# Patient Record
Sex: FEMALE | URBAN - METROPOLITAN AREA
[De-identification: names, ages, dates, MRNs, and addresses within clinical notes are randomized per-mention and may not be internally consistent; named-entity substitution may affect disease eponyms.]

---

## 2017-05-17 ENCOUNTER — APPOINTMENT (RX ONLY)
Dept: URBAN - METROPOLITAN AREA CLINIC 45 | Facility: CLINIC | Age: 22
Setting detail: DERMATOLOGY
End: 2017-05-17

## 2017-05-17 DIAGNOSIS — Z79.899 OTHER LONG TERM (CURRENT) DRUG THERAPY: ICD-10-CM

## 2017-05-17 DIAGNOSIS — L85.3 XEROSIS CUTIS: ICD-10-CM

## 2017-05-17 DIAGNOSIS — L70.0 ACNE VULGARIS: ICD-10-CM

## 2017-05-17 PROCEDURE — ? PRESCRIPTION

## 2017-05-17 PROCEDURE — 99214 OFFICE O/P EST MOD 30 MIN: CPT

## 2017-05-17 PROCEDURE — ? TREATMENT REGIMEN

## 2017-05-17 PROCEDURE — ? HIGH RISK MEDICATION MONITORING

## 2017-05-17 PROCEDURE — ? ORDER TESTS

## 2017-05-17 PROCEDURE — ? COUNSELING

## 2017-05-17 PROCEDURE — ? DIAGNOSIS COMMENT

## 2017-05-17 RX ORDER — ISOTRETINOIN 40 MG/1
CAPSULE ORAL
Qty: 60 | Refills: 0 | Status: ERX | COMMUNITY
Start: 2017-05-17

## 2017-05-17 RX ADMIN — ISOTRETINOIN: 40 CAPSULE ORAL at 12:53

## 2017-05-17 ASSESSMENT — LOCATION SIMPLE DESCRIPTION DERM
LOCATION SIMPLE: LEFT CHEEK
LOCATION SIMPLE: RIGHT CHEEK

## 2017-05-17 ASSESSMENT — LOCATION DETAILED DESCRIPTION DERM
LOCATION DETAILED: LEFT INFERIOR CENTRAL MALAR CHEEK
LOCATION DETAILED: RIGHT INFERIOR CENTRAL MALAR CHEEK
LOCATION DETAILED: LEFT MEDIAL MALAR CHEEK

## 2017-05-17 ASSESSMENT — LOCATION ZONE DERM: LOCATION ZONE: FACE

## 2017-05-17 NOTE — PROCEDURE: TREATMENT REGIMEN
Detail Level: Zone
Plan: Location: face\\nDuration: finished 2nd month, going on 3rd \\nMethod of Contraception: abs\\nPrescribed: Absorica 80mg daily\\nPharmacy: [facility]\\niPledge: [phone]\\n\\n\\n\\nPt is here for a one month accutane follow up. She does not complain of side effects other than dryness.\\nShe continues to improve, her deep cystic acne is not as painful as before. Breakouts are less frequent \\nDiscussed with her to continue taking 80mg daily. Continue usage of cerave reji and ointment to help with dryness\\nIPLEDGE program and consent form discussed with patient. The side effects and warnings for the use of Accutane were discussed with the patient. She understands she can not donate blood for 1 month post tx and can not have laser tx, cosmetic surgery, waxing or peels for 6 months post treatment.\\n\\n\\nDiscussed musculoskeletal SE’s in detail. Discussed HA’s, dry skin, and depression in detail. Patient denies depression. Denies personal or family H/O Crohns, IBS, IBD, or bloody stools.\\n\\n\\nUnderstand the need to fill Rx in 6 days and have monthly labs and OV.

## 2017-05-17 NOTE — HPI: PIMPLES (ACNE)
How Severe Is Your Acne?: severe
Is This A New Presentation, Or A Follow-Up?: Follow Up Acne
Additional Comments (Use Complete Sentences): Pt finished 2nd month, going on 3rd

## 2017-06-22 ENCOUNTER — APPOINTMENT (RX ONLY)
Dept: URBAN - METROPOLITAN AREA CLINIC 45 | Facility: CLINIC | Age: 22
Setting detail: DERMATOLOGY
End: 2017-06-22

## 2017-06-22 VITALS — WEIGHT: 135 LBS | HEIGHT: 62 IN

## 2017-06-22 DIAGNOSIS — Z79.899 OTHER LONG TERM (CURRENT) DRUG THERAPY: ICD-10-CM

## 2017-06-22 DIAGNOSIS — L70.0 ACNE VULGARIS: ICD-10-CM

## 2017-06-22 DIAGNOSIS — L85.3 XEROSIS CUTIS: ICD-10-CM

## 2017-06-22 PROCEDURE — ? COUNSELING

## 2017-06-22 PROCEDURE — ? ORDER TESTS

## 2017-06-22 PROCEDURE — ? TREATMENT REGIMEN

## 2017-06-22 PROCEDURE — ? PRESCRIPTION

## 2017-06-22 PROCEDURE — 99214 OFFICE O/P EST MOD 30 MIN: CPT

## 2017-06-22 PROCEDURE — ? DIAGNOSIS COMMENT

## 2017-06-22 PROCEDURE — ? HIGH RISK MEDICATION MONITORING

## 2017-06-22 RX ORDER — ISOTRETINOIN 40 MG/1
CAPSULE ORAL
Qty: 60 | Refills: 0 | Status: ERX

## 2017-06-22 ASSESSMENT — LOCATION SIMPLE DESCRIPTION DERM
LOCATION SIMPLE: LEFT CHEEK
LOCATION SIMPLE: RIGHT CHEEK

## 2017-06-22 ASSESSMENT — LOCATION DETAILED DESCRIPTION DERM
LOCATION DETAILED: RIGHT INFERIOR CENTRAL MALAR CHEEK
LOCATION DETAILED: LEFT INFERIOR CENTRAL MALAR CHEEK
LOCATION DETAILED: LEFT MEDIAL MALAR CHEEK

## 2017-06-22 ASSESSMENT — LOCATION ZONE DERM: LOCATION ZONE: FACE

## 2017-06-22 NOTE — PROCEDURE: TREATMENT REGIMEN
Detail Level: Zone
Plan: Location: face\\nDuration: finished 3rd month, going on 4th\\nMethod of Contraception: abs\\nPrescribed: Absorica 80mg daily\\nPharmacy: [facility]\\niPledge: [phone]\\n\\n\\n\\nPt is here for a 3rd month accutane follow up and is responding well to the medication. \\nToday, she presents several open and closed comedones, and larger ones will be extracted today with an 11 blade and cotton swabs.\\nShe denies experiencing joint pain, headaches, mood changes, and depression.\\nShe continues to improve, her deep cystic acne is not as painful as before. Breakouts are less frequent.\\nDiscussed with her to continue taking 80mg daily. Continue usage of cerave MC and ointment to help with dryness.\\n\\nIPLEDGE program and consent form discussed with patient. The side effects and warnings for the use of Accutane were discussed with the patient. She understands she can not donate blood for 1 month post tx and can not have laser tx, cosmetic surgery, waxing or peels for 6 months post treatment.\\n\\n\\nDiscussed musculoskeletal SE’s in detail. Discussed HA’s, dry skin, and depression in detail. Patient denies depression. Denies personal or family H/O Crohns, IBS, IBD, or bloody stools.\\n\\n\\nUnderstand the need to fill Rx in 6 days and have monthly labs and OV.

## 2017-07-26 ENCOUNTER — APPOINTMENT (RX ONLY)
Dept: URBAN - METROPOLITAN AREA CLINIC 45 | Facility: CLINIC | Age: 22
Setting detail: DERMATOLOGY
End: 2017-07-26

## 2017-07-26 ENCOUNTER — RX ONLY (OUTPATIENT)
Age: 22
Setting detail: RX ONLY
End: 2017-07-26

## 2017-07-26 DIAGNOSIS — Z79.899 OTHER LONG TERM (CURRENT) DRUG THERAPY: ICD-10-CM

## 2017-07-26 DIAGNOSIS — L70.0 ACNE VULGARIS: ICD-10-CM

## 2017-07-26 DIAGNOSIS — L85.3 XEROSIS CUTIS: ICD-10-CM

## 2017-07-26 PROCEDURE — 99214 OFFICE O/P EST MOD 30 MIN: CPT

## 2017-07-26 PROCEDURE — ? DIAGNOSIS COMMENT

## 2017-07-26 PROCEDURE — ? PRESCRIPTION

## 2017-07-26 PROCEDURE — ? TREATMENT REGIMEN

## 2017-07-26 PROCEDURE — ? HIGH RISK MEDICATION MONITORING

## 2017-07-26 PROCEDURE — ? COUNSELING

## 2017-07-26 RX ORDER — ISOTRETINOIN 40 MG/1
CAPSULE ORAL
Qty: 60 | Refills: 0 | Status: ERX

## 2017-07-26 ASSESSMENT — LOCATION DETAILED DESCRIPTION DERM
LOCATION DETAILED: LEFT MEDIAL MALAR CHEEK
LOCATION DETAILED: RIGHT INFERIOR CENTRAL MALAR CHEEK
LOCATION DETAILED: LEFT INFERIOR CENTRAL MALAR CHEEK

## 2017-07-26 ASSESSMENT — LOCATION SIMPLE DESCRIPTION DERM
LOCATION SIMPLE: RIGHT CHEEK
LOCATION SIMPLE: LEFT CHEEK

## 2017-07-26 ASSESSMENT — LOCATION ZONE DERM: LOCATION ZONE: FACE

## 2017-07-26 NOTE — PROCEDURE: TREATMENT REGIMEN
Detail Level: Zone
Plan: Location: face\\nDuration: finished 4th going on 5th\\nMethod of Contraception: abs\\nPrescribed: Absorica 80mg daily\\nPharmacy: [facility]\\niPledge: [phone]\\n\\n\\n\\nPt is here for a 4 month accutane follow up and is responding well to the medication. \\nToday, she presents only one open and closed comedones, which will be extracted today with an 11 blade and cotton swabs.\\nShe denies experiencing joint pain, headaches, mood changes, and depression.\\nShe continues to improve, her deep cystic acne is not as painful as before. Breakouts are less frequent.\\nDiscussed with her to continue taking 80mg daily. Continue usage of cerave MC and ointment to help with dryness.\\n\\nIPLEDGE program and consent form discussed with patient. The side effects and warnings for the use of Accutane were discussed with the patient. She understands she can not donate blood for 1 month post tx and can not have laser tx, cosmetic surgery, waxing or peels for 6 months post treatment.\\n\\n\\nDiscussed musculoskeletal SE’s in detail. Discussed HA’s, dry skin, and depression in detail. Patient denies depression. Denies personal or family H/O Crohns, IBS, IBD, or bloody stools.\\n\\n\\nUnderstand the need to fill Rx in 6 days and have monthly labs and OV.

## 2017-08-24 ENCOUNTER — APPOINTMENT (RX ONLY)
Dept: URBAN - METROPOLITAN AREA CLINIC 45 | Facility: CLINIC | Age: 22
Setting detail: DERMATOLOGY
End: 2017-08-24

## 2017-08-24 DIAGNOSIS — L70.0 ACNE VULGARIS: ICD-10-CM

## 2017-08-24 DIAGNOSIS — L85.3 XEROSIS CUTIS: ICD-10-CM

## 2017-08-24 DIAGNOSIS — Z79.899 OTHER LONG TERM (CURRENT) DRUG THERAPY: ICD-10-CM

## 2017-08-24 PROCEDURE — ? HIGH RISK MEDICATION MONITORING

## 2017-08-24 PROCEDURE — ? PRESCRIPTION

## 2017-08-24 PROCEDURE — ? TREATMENT REGIMEN

## 2017-08-24 PROCEDURE — 99214 OFFICE O/P EST MOD 30 MIN: CPT

## 2017-08-24 PROCEDURE — ? COUNSELING

## 2017-08-24 PROCEDURE — ? DIAGNOSIS COMMENT

## 2017-08-24 RX ORDER — ISOTRETINOIN 40 MG/1
CAPSULE ORAL
Qty: 60 | Refills: 0 | Status: ERX

## 2017-08-24 ASSESSMENT — LOCATION ZONE DERM: LOCATION ZONE: FACE

## 2017-08-24 ASSESSMENT — LOCATION DETAILED DESCRIPTION DERM
LOCATION DETAILED: LEFT MEDIAL MALAR CHEEK
LOCATION DETAILED: LEFT INFERIOR CENTRAL MALAR CHEEK
LOCATION DETAILED: RIGHT INFERIOR CENTRAL MALAR CHEEK

## 2017-08-24 ASSESSMENT — LOCATION SIMPLE DESCRIPTION DERM
LOCATION SIMPLE: LEFT CHEEK
LOCATION SIMPLE: RIGHT CHEEK

## 2017-08-24 NOTE — PROCEDURE: TREATMENT REGIMEN
Plan: Location: face\\nDuration: finished 5th going on 6th\\nMethod of Contraception: abs\\nPrescribed: Absorica 80mg daily\\nPharmacy: [facility]\\niPledge: [phone]\\n\\n\\n\\nPt is here for a 5th month accutane follow up and is responding well to the medication. \\nToday, she presents only one open and closed comedones, which will be extracted today with an 11 blade and cotton swabs.\\nShe denies experiencing joint pain, headaches, mood changes, and depression.\\nShe continues to improve, her deep cystic acne is not as painful as before. Breakouts are less frequent.\\nDiscussed with her to continue taking 80mg daily. Continue usage of cerave MC and ointment to help with dryness.\\n\\nIPLEDGE program and consent form discussed with patient. The side effects and warnings for the use of Accutane were discussed with the patient. She understands she can not donate blood for 1 month post tx and can not have laser tx, cosmetic surgery, waxing or peels for 6 months post treatment.\\n\\n\\nDiscussed musculoskeletal SE’s in detail. Discussed HA’s, dry skin, and depression in detail. Patient denies depression. Denies personal or family H/O Crohns, IBS, IBD, or bloody stools.\\n\\n\\nUnderstand the need to fill Rx in 6 days and have monthly labs and OV.
Detail Level: Zone

## 2017-09-28 ENCOUNTER — APPOINTMENT (RX ONLY)
Dept: URBAN - METROPOLITAN AREA CLINIC 45 | Facility: CLINIC | Age: 22
Setting detail: DERMATOLOGY
End: 2017-09-28

## 2017-09-28 VITALS — WEIGHT: 140 LBS | HEIGHT: 62 IN

## 2017-09-28 DIAGNOSIS — L85.3 XEROSIS CUTIS: ICD-10-CM

## 2017-09-28 DIAGNOSIS — Z79.899 OTHER LONG TERM (CURRENT) DRUG THERAPY: ICD-10-CM

## 2017-09-28 DIAGNOSIS — L70.0 ACNE VULGARIS: ICD-10-CM

## 2017-09-28 PROCEDURE — 99214 OFFICE O/P EST MOD 30 MIN: CPT

## 2017-09-28 PROCEDURE — ? TREATMENT REGIMEN

## 2017-09-28 PROCEDURE — ? PRESCRIPTION

## 2017-09-28 PROCEDURE — ? COUNSELING

## 2017-09-28 PROCEDURE — ? DIAGNOSIS COMMENT

## 2017-09-28 PROCEDURE — ? HIGH RISK MEDICATION MONITORING

## 2017-09-28 RX ORDER — ISOTRETINOIN 40 MG/1
CAPSULE ORAL
Qty: 60 | Refills: 0 | Status: ERX

## 2017-09-28 ASSESSMENT — LOCATION ZONE DERM: LOCATION ZONE: FACE

## 2017-09-28 ASSESSMENT — LOCATION SIMPLE DESCRIPTION DERM
LOCATION SIMPLE: RIGHT CHEEK
LOCATION SIMPLE: LEFT CHEEK

## 2017-09-28 NOTE — PROCEDURE: TREATMENT REGIMEN
Detail Level: Zone
Plan: Location: face\\nDuration: finished 6th going on 7th\\nMethod of Contraception: Abstinence\\nPrescribed: Absorica 80mg daily\\nPharmacy: [facility]\\niPledge: [phone]\\n\\nPt is here for a 6th month accutane follow up and is responding well to the medication. \\nToday, she presents with a lot of hyperpigmentation-- discussed with pt that she can have treatment with laser at [place] office after finishing Accutane treatment\\nPt states she had three small breakouts within the month.\\nShe denies experiencing joint pain, headaches, mood changes, and depression.\\nOverall, the patient is very satisfied with the results and breakouts are less frequent.\\nDiscussed with her to continue taking 80mg daily. \\nDiscussed with pt to take medication with fatty meal.\\nContinue usage of cerave MC and ointment to help with dryness.\\nUrine pregnancy: negative.\\nDiscussed with pt since she is still having flare ups, will continue another month of Accutane.\\nDiscussed with pt if flare ups stop next month, then we will try to take a one month break to see if acne returns.\\nDiscussed with pt during break, if she has a flare up or oiliness comes back, then we will continue her on Accutane.\\n\\nIPLEDGE program and consent form discussed with patient. The side effects and warnings for the use of Accutane were discussed with the patient. She understands she can not donate blood for 1 month post tx and can not have laser tx, cosmetic surgery, waxing or peels for 6 months post treatment.\\n\\nDiscussed musculoskeletal SE’s in detail. Discussed HA’s, dry skin, and depression in detail. Patient denies depression. Denies personal or family H/O Crohns, IBS, IBD, or bloody stools.\\n\\nUnderstand the need to fill Rx in 6 days and have monthly labs and OV.\\nF/u in 1 month

## 2017-10-19 ENCOUNTER — RX ONLY (OUTPATIENT)
Age: 22
Setting detail: RX ONLY
End: 2017-10-19

## 2017-10-19 RX ORDER — DESOXIMETASONE 2.5 MG/ML
SPRAY TOPICAL
Qty: 1 | Refills: 4 | Status: ERX | COMMUNITY
Start: 2017-10-19

## 2017-10-27 ENCOUNTER — APPOINTMENT (RX ONLY)
Dept: URBAN - METROPOLITAN AREA CLINIC 45 | Facility: CLINIC | Age: 22
Setting detail: DERMATOLOGY
End: 2017-10-27

## 2017-10-27 DIAGNOSIS — L57.0 ACTINIC KERATOSIS: ICD-10-CM

## 2017-10-27 PROCEDURE — ? PDT: BLUE

## 2017-10-27 PROCEDURE — ? TREATMENT REGIMEN

## 2017-10-27 PROCEDURE — ? PRESCRIPTION

## 2017-10-27 RX ORDER — SULFAMETHOXAZOLE AND TRIMETHOPRIM 800; 160 MG/1; MG/1
TABLET ORAL
Qty: 20 | Refills: 0 | Status: CANCELLED
Stop reason: CLARIF

## 2017-10-27 RX ORDER — TRAMADOL HYDROCHLORIDE 50 MG/1
TABLET, FILM COATED ORAL
Qty: 30 | Refills: 0 | Status: CANCELLED
Stop reason: CLARIF

## 2017-10-27 RX ORDER — PREDNISONE 20 MG/1
TABLET ORAL
Qty: 8 | Refills: 0 | Status: CANCELLED
Stop reason: CLARIF

## 2017-10-27 RX ORDER — VALACYCLOVIR HYDROCHLORIDE 1 G/1
TABLET, FILM COATED ORAL
Qty: 14 | Refills: 0 | Status: CANCELLED
Stop reason: CLARIF

## 2017-10-27 ASSESSMENT — LOCATION SIMPLE DESCRIPTION DERM
LOCATION SIMPLE: LEFT FOREARM
LOCATION SIMPLE: RIGHT PRETIBIAL REGION
LOCATION SIMPLE: LEFT CHEEK
LOCATION SIMPLE: NOSE
LOCATION SIMPLE: LEFT ELBOW
LOCATION SIMPLE: RIGHT FOREHEAD
LOCATION SIMPLE: CHEST
LOCATION SIMPLE: INFERIOR FOREHEAD
LOCATION SIMPLE: ANTERIOR SCALP
LOCATION SIMPLE: RIGHT EAR
LOCATION SIMPLE: LEFT SCALP
LOCATION SIMPLE: SCALP
LOCATION SIMPLE: POSTERIOR SCALP
LOCATION SIMPLE: RIGHT CHEEK
LOCATION SIMPLE: LEFT KNEE
LOCATION SIMPLE: SUPERIOR FOREHEAD
LOCATION SIMPLE: RIGHT KNEE
LOCATION SIMPLE: UPPER LIP
LOCATION SIMPLE: LEFT EAR
LOCATION SIMPLE: LEFT PRETIBIAL REGION
LOCATION SIMPLE: CHIN
LOCATION SIMPLE: GLABELLA
LOCATION SIMPLE: RIGHT SCALP
LOCATION SIMPLE: LEFT FOREHEAD
LOCATION SIMPLE: LEFT HAND
LOCATION SIMPLE: RIGHT HAND
LOCATION SIMPLE: RIGHT SHOULDER
LOCATION SIMPLE: RIGHT ELBOW
LOCATION SIMPLE: RIGHT FOREARM

## 2017-10-27 ASSESSMENT — LOCATION DETAILED DESCRIPTION DERM
LOCATION DETAILED: LEFT MEDIAL SUPERIOR CHEST
LOCATION DETAILED: SUPERIOR MID FOREHEAD
LOCATION DETAILED: LEFT CENTRAL BUCCAL CHEEK
LOCATION DETAILED: RIGHT CENTRAL PARIETAL SCALP
LOCATION DETAILED: POSTERIOR MID-PARIETAL SCALP
LOCATION DETAILED: LEFT MENTAL CREASE
LOCATION DETAILED: RIGHT INFERIOR LATERAL FOREHEAD
LOCATION DETAILED: RIGHT CENTRAL MALAR CHEEK
LOCATION DETAILED: LEFT MEDIAL BUCCAL CHEEK
LOCATION DETAILED: RIGHT ANTERIOR SHOULDER
LOCATION DETAILED: RIGHT INFERIOR FOREHEAD
LOCATION DETAILED: LEFT SUPERIOR PARIETAL SCALP
LOCATION DETAILED: LEFT SUPERIOR MEDIAL BUCCAL CHEEK
LOCATION DETAILED: LEFT SUPERIOR LATERAL BUCCAL CHEEK
LOCATION DETAILED: LEFT INFERIOR FOREHEAD
LOCATION DETAILED: RIGHT INFERIOR CENTRAL MALAR CHEEK
LOCATION DETAILED: LEFT CENTRAL FRONTAL SCALP
LOCATION DETAILED: LEFT MEDIAL MALAR CHEEK
LOCATION DETAILED: LEFT RADIAL DORSAL HAND
LOCATION DETAILED: LEFT ELBOW
LOCATION DETAILED: RIGHT LATERAL FOREHEAD
LOCATION DETAILED: LEFT CHIN
LOCATION DETAILED: LEFT INFERIOR LATERAL MALAR CHEEK
LOCATION DETAILED: INFERIOR MID FOREHEAD
LOCATION DETAILED: LEFT PROXIMAL DORSAL FOREARM
LOCATION DETAILED: 4TH WEB SPACE RIGHT HAND
LOCATION DETAILED: RIGHT ANTERIOR EARLOBE
LOCATION DETAILED: RIGHT FOREHEAD
LOCATION DETAILED: LEFT LATERAL SUPERIOR CHEST
LOCATION DETAILED: LEFT DISTAL DORSAL FOREARM
LOCATION DETAILED: UPPER STERNUM
LOCATION DETAILED: RIGHT INFERIOR MEDIAL MALAR CHEEK
LOCATION DETAILED: NASAL ROOT
LOCATION DETAILED: LEFT FOREHEAD
LOCATION DETAILED: LEFT INFERIOR MEDIAL MALAR CHEEK
LOCATION DETAILED: LEFT PROXIMAL PRETIBIAL REGION
LOCATION DETAILED: LEFT SUPERIOR FOREHEAD
LOCATION DETAILED: RIGHT SUPERIOR FOREHEAD
LOCATION DETAILED: RIGHT PROXIMAL PRETIBIAL REGION
LOCATION DETAILED: RIGHT SUPERIOR HELIX
LOCATION DETAILED: GLABELLA
LOCATION DETAILED: RIGHT LATERAL SUPERIOR CHEST
LOCATION DETAILED: RIGHT MEDIAL MALAR CHEEK
LOCATION DETAILED: RIGHT MEDIAL SUPERIOR CHEST
LOCATION DETAILED: RIGHT ELBOW
LOCATION DETAILED: LEFT ULNAR DORSAL HAND
LOCATION DETAILED: RIGHT KNEE
LOCATION DETAILED: LEFT MEDIAL FOREHEAD
LOCATION DETAILED: MIDDLE STERNUM
LOCATION DETAILED: RIGHT ULNAR DORSAL HAND
LOCATION DETAILED: LEFT CENTRAL PARIETAL SCALP
LOCATION DETAILED: PHILTRUM
LOCATION DETAILED: LEFT INFERIOR CENTRAL MALAR CHEEK
LOCATION DETAILED: RIGHT PROXIMAL DORSAL FOREARM
LOCATION DETAILED: LEFT ANTERIOR EARLOBE
LOCATION DETAILED: RIGHT RADIAL DORSAL HAND
LOCATION DETAILED: LEFT INFERIOR LATERAL FOREHEAD
LOCATION DETAILED: LEFT LATERAL MALAR CHEEK
LOCATION DETAILED: RIGHT CENTRAL FRONTAL SCALP
LOCATION DETAILED: RIGHT CENTRAL BUCCAL CHEEK
LOCATION DETAILED: RIGHT DISTAL DORSAL FOREARM
LOCATION DETAILED: MID-OCCIPITAL SCALP
LOCATION DETAILED: MID-FRONTAL SCALP
LOCATION DETAILED: LEFT KNEE
LOCATION DETAILED: RIGHT ANTIHELIX
LOCATION DETAILED: RIGHT DISTAL PRETIBIAL REGION
LOCATION DETAILED: RIGHT SUPERIOR MEDIAL BUCCAL CHEEK
LOCATION DETAILED: LEFT DISTAL PRETIBIAL REGION
LOCATION DETAILED: LEFT SUPERIOR HELIX
LOCATION DETAILED: NASAL DORSUM

## 2017-10-27 ASSESSMENT — LOCATION ZONE DERM
LOCATION ZONE: ARM
LOCATION ZONE: FACE
LOCATION ZONE: TRUNK
LOCATION ZONE: LEG
LOCATION ZONE: SCALP
LOCATION ZONE: NOSE
LOCATION ZONE: HAND
LOCATION ZONE: EAR
LOCATION ZONE: LIP

## 2017-10-27 NOTE — PROCEDURE: PDT: BLUE
Incubation Time: 04:00:00
Treatment Number: 0
Detail Level: Zone
Illumination Time: 00:16:40
Anesthesia Type: 1% lidocaine with epinephrine
Post-Care Instructions: I reviewed with the patient in detail post-care instructions. Patient is to avoid sunlight for the next 2 days, and wear sun protection. Patients may expect sunburn like redness, discomfort and scabbing.
Was Levulan/Ameluz Applied On A Previous Day?: No
Pre-Procedure Text: The treatment areas were cleaned and prepped in the usual fashion.
Number Of Kerasticks/Tubes Billed For: 1
Which Photosensitizer Was Used: Levulan
Consent: Written consent obtained.  The risks were reviewed with the patient including but not limited to: pigmentary changes, pain, blistering, scabbing, redness, and the remote possibility of scarring.
Light Source: Reggie-U
Consent: Written consent obtained.  The risks were reviewed with the patient including but not limited to: pigmentary changes, pain, blistering, scabbing, redness, and the remote possibility of scarring.
Incubation Time: 02:00:00
Incubation Time: 03:00:00

## 2017-11-02 ENCOUNTER — APPOINTMENT (RX ONLY)
Dept: URBAN - METROPOLITAN AREA CLINIC 45 | Facility: CLINIC | Age: 22
Setting detail: DERMATOLOGY
End: 2017-11-02

## 2017-11-02 DIAGNOSIS — L70.0 ACNE VULGARIS: ICD-10-CM

## 2017-11-02 DIAGNOSIS — L85.3 XEROSIS CUTIS: ICD-10-CM

## 2017-11-02 DIAGNOSIS — Z79.899 OTHER LONG TERM (CURRENT) DRUG THERAPY: ICD-10-CM

## 2017-11-02 PROCEDURE — ? DIAGNOSIS COMMENT

## 2017-11-02 PROCEDURE — ? PRESCRIPTION

## 2017-11-02 PROCEDURE — ? COUNSELING

## 2017-11-02 PROCEDURE — ? HIGH RISK MEDICATION MONITORING

## 2017-11-02 PROCEDURE — ? TREATMENT REGIMEN

## 2017-11-02 RX ORDER — ISOTRETINOIN 40 MG/1
CAPSULE ORAL
Qty: 60 | Refills: 0 | Status: ERX

## 2017-11-02 ASSESSMENT — LOCATION ZONE DERM: LOCATION ZONE: FACE

## 2017-11-02 ASSESSMENT — LOCATION SIMPLE DESCRIPTION DERM
LOCATION SIMPLE: RIGHT CHEEK
LOCATION SIMPLE: LEFT CHEEK

## 2017-11-02 NOTE — PROCEDURE: TREATMENT REGIMEN
Plan: Location: face\\nDuration: finished 7th going on 8th\\nMethod of Contraception: Abstinence\\nPrescribed: Absorica 80mg daily\\nPharmacy: [facility]\\niPledge: [phone]\\n\\nPt is here for Accutane f/u.\\nPt is on currently on Absorica 80mg po qd x 30 days.\\nPt states her skin is getting smoother and has improved while on medication.\\nPt discussed that she has dryness, but no other side effects.\\nPt states that she still gets flare ups, but not as frequent or intense as last month.\\nToday pt still has post inflammatory hyperpigmentation on checks as well.\\nDiscussed with pt that she can go to [place] office for IPL treatment after she is done with her Accutane \\nDiscussed with pt to use MC/ healing ointment such as Cerave to help reestablish a healthy skin barrier.\\nWill continue pt on Absorica 80mg po qd x 30 days.\\nDiscussed with pt to take medication with fatty meal.\\nDiscussed with pt since she is still having flare ups, will continue another month of Accutane.\\nDiscussed with pt that this may be her last month.\\nDiscussed that we may take a break next month to see if acne returns.\\nDiscussed with pt during break, if she has a flare up or oiliness comes back, then we will continue her on Accutane.\\n\\nIPLEDGE program and consent form discussed with patient. The side effects and warnings for the use of Accutane were discussed with the patient. She understands she can not donate blood for 1 month post tx and can not have laser tx, cosmetic surgery, waxing or peels for 6 months post treatment.\\n\\nDiscussed musculoskeletal SE’s in detail. Discussed HA’s, dry skin, and depression in detail. Patient denies depression. Denies personal or family H/O Crohns, IBS, IBD, or bloody stools.\\n\\nUnderstand the need to fill Rx in 6 days and have monthly labs and OV.\\nF/u in 1 month
Detail Level: Zone

## 2017-12-06 ENCOUNTER — APPOINTMENT (RX ONLY)
Dept: URBAN - METROPOLITAN AREA CLINIC 45 | Facility: CLINIC | Age: 22
Setting detail: DERMATOLOGY
End: 2017-12-06

## 2017-12-06 DIAGNOSIS — L85.3 XEROSIS CUTIS: ICD-10-CM

## 2017-12-06 DIAGNOSIS — Z79.899 OTHER LONG TERM (CURRENT) DRUG THERAPY: ICD-10-CM

## 2017-12-06 DIAGNOSIS — L70.0 ACNE VULGARIS: ICD-10-CM

## 2017-12-06 PROCEDURE — ? HIGH RISK MEDICATION MONITORING

## 2017-12-06 PROCEDURE — ? DIAGNOSIS COMMENT

## 2017-12-06 PROCEDURE — ? COUNSELING

## 2017-12-06 PROCEDURE — 99214 OFFICE O/P EST MOD 30 MIN: CPT

## 2017-12-06 PROCEDURE — 81025 URINE PREGNANCY TEST: CPT

## 2017-12-06 PROCEDURE — ? URINE PREGNANCY TEST

## 2017-12-06 PROCEDURE — ? TREATMENT REGIMEN

## 2017-12-06 ASSESSMENT — LOCATION SIMPLE DESCRIPTION DERM
LOCATION SIMPLE: RIGHT CHEEK
LOCATION SIMPLE: LEFT CHEEK

## 2017-12-06 ASSESSMENT — LOCATION ZONE DERM: LOCATION ZONE: FACE

## 2017-12-06 NOTE — PROCEDURE: TREATMENT REGIMEN
Detail Level: Zone
Plan: Location: face\\nDuration: finished 8th\\nMethod of Contraception: Abstinence\\nPrescribed: CONT Absorica 80mg pro qd x 30 days-- will not send Rx today; Pt states she has one box of Absorica left-- instructed pt to finish remaining supply of Absorica then take a break \\nPharmacy: [facility]\\niPledge: [phone]\\n\\nPt is here for Accutane f/u.\\nPt is on currently on Absorica 80mg po qd x 30 days.\\nPt states her skin is getting smoother and has improved while on medication.\\nPt discussed that she has dryness, but no other side effects.\\nToday pt still has post inflammatory hyperpigmentation on cheeks-- discussed with pt that redness should decrease while off of Accutane \\nDiscussed with pt that she can go to [place] office for IPL treatment after she is done with her Accutane \\nWill continue Absorica 80mg pro qd x 30 days-- will not send Rx today; Pt states she has one box of Absorica left-- instructed pt to finish remaining supply of Absorica then take a break\\nWill not start pt on HQ/Tret cream because pt's skin is still very dry and sensitive \\n\\nDiscussed with pt during break, if she has a flare up or oiliness comes back, then we will continue her on Accutane\\nDiscussed with pt to use MC/ healing ointment such as Cerave to help reestablish a healthy skin barrier.\\n\\nIPLEDGE program and consent form discussed with patient. The side effects and warnings for the use of Accutane were discussed with the patient. She understands she can not donate blood for 1 month post tx and can not have laser tx, cosmetic surgery, waxing or peels for 6 months post treatment.\\n\\nDiscussed musculoskeletal SE’s in detail. Discussed HA’s, dry skin, and depression in detail. Patient denies depression. Denies personal or family H/O Crohns, IBS, IBD, or bloody stools.\\n\\nUnderstand the need to fill Rx in 6 days and have monthly labs and OV.\\nF/u in 3 months

## 2018-03-07 ENCOUNTER — APPOINTMENT (RX ONLY)
Dept: URBAN - METROPOLITAN AREA CLINIC 45 | Facility: CLINIC | Age: 23
Setting detail: DERMATOLOGY
End: 2018-03-07

## 2018-03-07 DIAGNOSIS — L85.3 XEROSIS CUTIS: ICD-10-CM

## 2018-03-07 DIAGNOSIS — Z79.899 OTHER LONG TERM (CURRENT) DRUG THERAPY: ICD-10-CM

## 2018-03-07 DIAGNOSIS — L70.0 ACNE VULGARIS: ICD-10-CM

## 2018-03-07 PROCEDURE — ? HIGH RISK MEDICATION MONITORING

## 2018-03-07 PROCEDURE — 99214 OFFICE O/P EST MOD 30 MIN: CPT

## 2018-03-07 PROCEDURE — 81025 URINE PREGNANCY TEST: CPT

## 2018-03-07 PROCEDURE — ? URINE PREGNANCY TEST

## 2018-03-07 PROCEDURE — ? DIAGNOSIS COMMENT

## 2018-03-07 PROCEDURE — ? COUNSELING

## 2018-03-07 PROCEDURE — ? TREATMENT REGIMEN

## 2018-03-07 ASSESSMENT — LOCATION SIMPLE DESCRIPTION DERM
LOCATION SIMPLE: RIGHT CHEEK
LOCATION SIMPLE: LEFT CHEEK

## 2018-03-07 ASSESSMENT — LOCATION ZONE DERM: LOCATION ZONE: FACE

## 2018-03-07 NOTE — PROCEDURE: TREATMENT REGIMEN
Detail Level: Zone
Plan: Location: Face\\nDuration: Fin[facility] 8th [facility]nMethod of Contraception: Abstinence\\nPrescribed: NO RX SENT TODAY\\nPharmacy: [facility]\\niPledge: [phone]\\nURINE PREGNANCY TEST: Negative\\n\\nPt is here for Accutane f/u.\\nPt finished 8 months of Absorica 80mg po qd and states that her acne has returned.\\nAlthough it is not as severe as it was when she first started, discussed with patient that this is indicative that she needs a few more months of medication.\\nPt states her skin is much smoother and has improved while on medication, but she can noticed her pores becoming clogged again. \\nPt discussed that she had dryness, but no other side effects while on medication. \\nToday pt still has post inflammatory hyperpigmentation on cheeks-- discussed with pt that redness should decrease while off of Accutane\\nWill continue Absorica 80mg pro qd x 30 days-- will not send Rx today; but will continue on this dosage in 30 days. \\n\\nDiscussed with pt to use MC/ healing ointment such as Cerave to help reestablish a healthy skin barrier.\\n\\nIPLEDGE program and consent form discussed with patient. The side effects and warnings for the use of Accutane were discussed with the patient. She understands she can not donate blood for 1 month post tx and can not have laser tx, cosmetic surgery, waxing or peels for 6 months post treatment.\\n\\nDiscussed musculoskeletal SE’s in detail. Discussed HA’s, dry skin, and depression in detail. Patient denies depression. Denies personal or family H/O Crohns, IBS, IBD, or bloody stools.\\n

## 2018-04-04 ENCOUNTER — APPOINTMENT (RX ONLY)
Dept: URBAN - METROPOLITAN AREA CLINIC 45 | Facility: CLINIC | Age: 23
Setting detail: DERMATOLOGY
End: 2018-04-04

## 2018-04-04 DIAGNOSIS — L85.3 XEROSIS CUTIS: ICD-10-CM

## 2018-04-04 DIAGNOSIS — L70.0 ACNE VULGARIS: ICD-10-CM

## 2018-04-04 DIAGNOSIS — Z79.899 OTHER LONG TERM (CURRENT) DRUG THERAPY: ICD-10-CM

## 2018-04-04 PROCEDURE — ? TREATMENT REGIMEN

## 2018-04-04 PROCEDURE — ? HIGH RISK MEDICATION MONITORING

## 2018-04-04 PROCEDURE — ? URINE PREGNANCY TEST

## 2018-04-04 PROCEDURE — ? COUNSELING

## 2018-04-04 PROCEDURE — ? DIAGNOSIS COMMENT

## 2018-04-04 ASSESSMENT — LOCATION SIMPLE DESCRIPTION DERM
LOCATION SIMPLE: LEFT CHEEK
LOCATION SIMPLE: RIGHT CHEEK

## 2018-04-04 ASSESSMENT — LOCATION ZONE DERM: LOCATION ZONE: FACE

## 2018-04-04 NOTE — PROCEDURE: TREATMENT REGIMEN
Plan: Location: Face\\nDuration: Finished 8th - RE-STARTING PATIENT TODAY (month 9)\\nMethod of Contraception: Abstinence\\nPrescribed: NO RX SENT TODAY-- will confirm and send Rx on 04/06/18\\nPharmacy: [facility]\\niPledge: [phone]\\nURINE PREGNANCY TEST: Negative\\n\\nPt is here for Accutane f/u.\\nPt finished 8 months of Absorica 80mg po qd and states that her acne has returned.\\nAlthough it is not as severe as it was when she first started, discussed with patient that this is indicative that she needs a few more months of medication.\\nPt states her skin is much smoother and has improved while on medication, but she can noticed her pores becoming clogged again. \\nPt discussed that she had dryness, but no other side effects while on medication. \\nToday pt still has post inflammatory hyperpigmentation on cheeks-- discussed with pt that redness should decrease while off of Accutane\\nWill continue Absorica 80mg pro qd x 30 days-- will confirm Pt and send Rx on 04/06/18 \\n\\nDiscussed with pt to use MC/ healing ointment such as Cerave to help reestablish a healthy skin barrier.\\n\\nIPLEDGE program and consent form discussed with patient. The side effects and warnings for the use of Accutane were discussed with the patient. She understands she can not donate blood for 1 month post tx and can not have laser tx, cosmetic surgery, waxing or peels for 6 months post treatment.\\n\\nDiscussed musculoskeletal SE’s in detail. Discussed HA’s, dry skin, and depression in detail. Patient denies depression. Denies personal or family H/O Crohns, IBS, IBD, or bloody stools.\\n\\nF/u in 1 month
Detail Level: Zone

## 2018-04-04 NOTE — PROCEDURE: DIAGNOSIS COMMENT
Detail Level: Simple
Comment: Nodulocystic Acne (finished 8th-- restarting Accutane today (month 9))

## 2018-04-09 ENCOUNTER — RX ONLY (OUTPATIENT)
Age: 23
Setting detail: RX ONLY
End: 2018-04-09

## 2018-04-09 RX ORDER — ISOTRETINOIN 40 MG/1
CAPSULE ORAL
Qty: 60 | Refills: 0 | Status: ERX

## 2018-04-19 ENCOUNTER — RX ONLY (OUTPATIENT)
Age: 23
Setting detail: RX ONLY
End: 2018-04-19

## 2018-04-19 RX ORDER — BIMATOPROST 0.3 MG/ML
SOLUTION/ DROPS OPHTHALMIC
Qty: 1 | Refills: 2 | Status: ERX | COMMUNITY
Start: 2018-04-19

## 2018-05-09 ENCOUNTER — APPOINTMENT (RX ONLY)
Dept: URBAN - METROPOLITAN AREA CLINIC 45 | Facility: CLINIC | Age: 23
Setting detail: DERMATOLOGY
End: 2018-05-09

## 2018-05-09 DIAGNOSIS — L85.3 XEROSIS CUTIS: ICD-10-CM

## 2018-05-09 DIAGNOSIS — Z79.899 OTHER LONG TERM (CURRENT) DRUG THERAPY: ICD-10-CM

## 2018-05-09 DIAGNOSIS — L70.0 ACNE VULGARIS: ICD-10-CM

## 2018-05-09 PROCEDURE — 99214 OFFICE O/P EST MOD 30 MIN: CPT

## 2018-05-09 PROCEDURE — ? HIGH RISK MEDICATION MONITORING

## 2018-05-09 PROCEDURE — ? TREATMENT REGIMEN

## 2018-05-09 PROCEDURE — 81025 URINE PREGNANCY TEST: CPT

## 2018-05-09 PROCEDURE — ? DIAGNOSIS COMMENT

## 2018-05-09 PROCEDURE — ? COUNSELING

## 2018-05-09 PROCEDURE — ? PRESCRIPTION

## 2018-05-09 PROCEDURE — ? URINE PREGNANCY TEST

## 2018-05-09 RX ORDER — ISOTRETINOIN 40 MG/1
CAPSULE ORAL
Qty: 60 | Refills: 0 | Status: ERX

## 2018-05-09 ASSESSMENT — LOCATION ZONE DERM: LOCATION ZONE: FACE

## 2018-05-09 ASSESSMENT — LOCATION SIMPLE DESCRIPTION DERM
LOCATION SIMPLE: RIGHT CHEEK
LOCATION SIMPLE: LEFT CHEEK

## 2018-05-09 NOTE — PROCEDURE: DIAGNOSIS COMMENT
Detail Level: Simple
Comment: Nodulocystic Acne (finished 9th-- going on 10th (2nd month of 2nd course))

## 2018-05-09 NOTE — PROCEDURE: TREATMENT REGIMEN
Plan: Location: Face\\nDuration: Finished 9th - going on 10th (2nd month of 2nd course))\\nMethod of Contraception: Abstinence\\nPrescribed: Absorica 40 mg, 2 capsules PO QD x 30 days \\nPharmacy: [facility]\\niPledge: [phone]\\nURINE PREGNANCY TEST: Negative\\n\\nPt is here for Accutane f/u.\\nPt finished 9 months of Absorica 80mg po qd and states that she still has acne lesions and open/closed comedones \\nAlthough it is not as severe as it was when she first started, discussed with patient that this is indicative that she needs a few more months of medication.\\nPt states her skin is much smoother and has improved while on medication, but she can noticed her pores becoming clogged again. \\nPt discussed that she had dryness, but no other side effects while on medication. \\nToday pt still has post inflammatory hyperpigmentation on cheeks-- discussed with pt that redness should decrease while off of Accutane\\nWill continue Absorica 80mg pro qd x 30 days\\n\\nDiscussed with pt to use MC/ healing ointment such as Cerave to help reestablish a healthy skin barrier.\\n\\nIPLEDGE program and consent form discussed with patient. The side effects and warnings for the use of Accutane were discussed with the patient. She understands she can not donate blood for 1 month post tx and can not have laser tx, cosmetic surgery, waxing or peels for 6 months post treatment.\\n\\nDiscussed musculoskeletal SE’s in detail. Discussed HA’s, dry skin, and depression in detail. Patient denies depression. Denies personal or family H/O Crohns, IBS, IBD, or bloody stools.\\n\\nF/u in 1 month
Detail Level: Zone

## 2018-06-13 ENCOUNTER — APPOINTMENT (RX ONLY)
Dept: URBAN - METROPOLITAN AREA CLINIC 45 | Facility: CLINIC | Age: 23
Setting detail: DERMATOLOGY
End: 2018-06-13

## 2018-06-13 DIAGNOSIS — L70.0 ACNE VULGARIS: ICD-10-CM

## 2018-06-13 DIAGNOSIS — L85.3 XEROSIS CUTIS: ICD-10-CM

## 2018-06-13 DIAGNOSIS — Z79.899 OTHER LONG TERM (CURRENT) DRUG THERAPY: ICD-10-CM

## 2018-06-13 PROCEDURE — 81025 URINE PREGNANCY TEST: CPT

## 2018-06-13 PROCEDURE — 99214 OFFICE O/P EST MOD 30 MIN: CPT

## 2018-06-13 PROCEDURE — ? DIAGNOSIS COMMENT

## 2018-06-13 PROCEDURE — ? TREATMENT REGIMEN

## 2018-06-13 PROCEDURE — ? URINE PREGNANCY TEST

## 2018-06-13 PROCEDURE — ? PRESCRIPTION

## 2018-06-13 PROCEDURE — ? COUNSELING

## 2018-06-13 PROCEDURE — ? HIGH RISK MEDICATION MONITORING

## 2018-06-13 RX ORDER — ISOTRETINOIN 40 MG/1
CAPSULE ORAL
Qty: 60 | Refills: 0 | Status: ERX

## 2018-06-13 ASSESSMENT — LOCATION SIMPLE DESCRIPTION DERM
LOCATION SIMPLE: LEFT CHEEK
LOCATION SIMPLE: RIGHT CHEEK

## 2018-06-13 ASSESSMENT — LOCATION ZONE DERM: LOCATION ZONE: FACE

## 2018-06-13 NOTE — PROCEDURE: DIAGNOSIS COMMENT
Detail Level: Simple
Comment: Nodulocystic Acne (finished 10th-- going on 11th (3rd month of 2nd course))

## 2018-06-13 NOTE — PROCEDURE: TREATMENT REGIMEN
Detail Level: Zone
Plan: Location: Face\\nDuration: Finished 10th - going on 11th (3rd month of 2nd course))\\nMethod of Contraception: Abstinence\\nPrescribed: Absorica 40 mg, 2 capsules PO QD x 30 days \\nPharmacy: [facility]\\niPledge: [phone]\\nURINE PREGNANCY TEST: Negative\\n\\nPt is here for Accutane f/u.\\nPt finished 10 months of Absorica 80mg po qd and states that she still has acne lesions and open/closed comedones \\nAlthough it is not as severe as it was when she first started, discussed with patient that this is indicative that she needs a few more months of medication.\\nPt states her skin is much smoother and has improved while on medication, but she can noticed her pores becoming clogged again. \\nPt discussed that she had dryness, but no other side effects while on medication. \\nToday pt still has post inflammatory hyperpigmentation on cheeks-- discussed with pt that redness should decrease while off of Accutane\\nWill continue Absorica 80mg pro qd x 30 days\\n\\nDiscussed with pt to use MC/ healing ointment such as Cerave to help reestablish a healthy skin barrier.\\n\\nIPLEDGE program and consent form discussed with patient. The side effects and warnings for the use of Accutane were discussed with the patient. She understands she can not donate blood for 1 month post tx and can not have laser tx, cosmetic surgery, waxing or peels for 6 months post treatment.\\n\\nDiscussed musculoskeletal SE’s in detail. Discussed HA’s, dry skin, and depression in detail. Patient denies depression. Denies personal or family H/O Crohns, IBS, IBD, or bloody stools.\\n\\nF/u in 1 month

## 2018-06-28 ENCOUNTER — RX ONLY (OUTPATIENT)
Age: 23
Setting detail: RX ONLY
End: 2018-06-28

## 2018-06-28 RX ORDER — CLOBETASOL PROPIONATE 0.5 MG/G
AEROSOL, FOAM TOPICAL
Qty: 1 | Refills: 6 | Status: ERX

## 2018-06-28 RX ORDER — HYDROCORTISONE ACETATE, IODOQUINOL 19; 10 MG/G; MG/G
CREAM TOPICAL
Qty: 30 | Refills: 6 | Status: ERX | COMMUNITY
Start: 2018-06-28

## 2018-06-28 RX ORDER — CLOBETASOL PROPIONATE 0.5 MG/G
AEROSOL, FOAM TOPICAL
Qty: 1 | Refills: 3 | Status: ERX | COMMUNITY
Start: 2018-06-28

## 2018-06-28 RX ORDER — SULFACETAMIDE SODIUM, SULFUR 100; 20 MG/G; MG/G
EMULSION TOPICAL
Qty: 1 | Refills: 5 | Status: ERX | COMMUNITY
Start: 2018-06-28

## 2018-07-03 ENCOUNTER — RX ONLY (OUTPATIENT)
Age: 23
Setting detail: RX ONLY
End: 2018-07-03

## 2018-07-03 RX ORDER — CLOBETASOL PROPIONATE 0.5 MG/G
OINTMENT TOPICAL
Qty: 1 | Refills: 6 | Status: ERX | COMMUNITY
Start: 2018-07-03

## 2018-07-03 RX ORDER — VALACYCLOVIR HYDROCHLORIDE 1 G/1
TABLET, FILM COATED ORAL
Qty: 14 | Refills: 10 | Status: ERX | COMMUNITY
Start: 2018-07-03

## 2018-07-13 ENCOUNTER — RX ONLY (OUTPATIENT)
Age: 23
Setting detail: RX ONLY
End: 2018-07-13

## 2018-07-13 RX ORDER — OXYMETAZOLINE HYDROCHLORIDE 10 MG/G
CREAM TOPICAL
Qty: 1 | Refills: 6 | Status: ERX | COMMUNITY
Start: 2018-07-13

## 2018-07-19 ENCOUNTER — APPOINTMENT (RX ONLY)
Dept: URBAN - METROPOLITAN AREA CLINIC 45 | Facility: CLINIC | Age: 23
Setting detail: DERMATOLOGY
End: 2018-07-19

## 2018-07-19 DIAGNOSIS — L70.0 ACNE VULGARIS: ICD-10-CM

## 2018-07-19 DIAGNOSIS — L85.3 XEROSIS CUTIS: ICD-10-CM

## 2018-07-19 DIAGNOSIS — Z79.899 OTHER LONG TERM (CURRENT) DRUG THERAPY: ICD-10-CM

## 2018-07-19 PROCEDURE — ? URINE PREGNANCY TEST

## 2018-07-19 PROCEDURE — 81025 URINE PREGNANCY TEST: CPT

## 2018-07-19 PROCEDURE — ? COUNSELING

## 2018-07-19 PROCEDURE — ? TREATMENT REGIMEN

## 2018-07-19 PROCEDURE — 99214 OFFICE O/P EST MOD 30 MIN: CPT

## 2018-07-19 PROCEDURE — ? PRESCRIPTION

## 2018-07-19 PROCEDURE — ? DIAGNOSIS COMMENT

## 2018-07-19 PROCEDURE — ? HIGH RISK MEDICATION MONITORING

## 2018-07-19 RX ORDER — ISOTRETINOIN 40 MG/1
CAPSULE ORAL
Qty: 60 | Refills: 0 | Status: ERX

## 2018-07-19 ASSESSMENT — LOCATION SIMPLE DESCRIPTION DERM
LOCATION SIMPLE: RIGHT CHEEK
LOCATION SIMPLE: LEFT CHEEK

## 2018-07-19 ASSESSMENT — LOCATION ZONE DERM: LOCATION ZONE: FACE

## 2018-07-19 NOTE — PROCEDURE: TREATMENT REGIMEN
Plan: Location: Face\\nDuration: Finished 3rd- going on 4th (of 2nd course)\\nMethod of Contraception: Abstinence\\nPrescribed: Absorica 40 mg, 2 capsules PO QD x 30 days \\nPharmacy: [facility]\\niPledge: [phone]\\nURINE PREGNANCY TEST: Negative\\n\\nPt is here for Accutane f/u.\\nPt is in second course of Accutane\\nPt finished 3 months of Absorica 80mg po qd and states that she still has acne lesions and open/closed comedones \\nAlthough it is not as severe as it was when she first started, discussed with patient that this is indicative that she needs a few more months of medication.\\nPt states her skin is much smoother and has improved while on medication, but she can noticed her pores becoming clogged again. \\nPt discussed that she had dryness, but no other side effects while on medication. \\nToday pt still has post inflammatory hyperpigmentation on cheeks-- discussed with pt that redness should decrease while off of Accutane\\nWill continue Absorica 80mg pro qd x 30 days\\n\\nDiscussed with pt to use MC/ healing ointment such as Cerave to help reestablish a healthy skin barrier.\\n\\nIPLEDGE program and consent form discussed with patient. The side effects and warnings for the use of Accutane were discussed with the patient. She understands she can not donate blood for 1 month post tx and can not have laser tx, cosmetic surgery, waxing or peels for 6 months post treatment.\\n\\nDiscussed musculoskeletal SE’s in detail. Discussed HA’s, dry skin, and depression in detail. Patient denies depression. Denies personal or family H/O Crohns, IBS, IBD, or bloody stools.\\n\\nF/u in 1 month
Detail Level: Zone

## 2018-08-23 ENCOUNTER — APPOINTMENT (RX ONLY)
Dept: URBAN - METROPOLITAN AREA CLINIC 45 | Facility: CLINIC | Age: 23
Setting detail: DERMATOLOGY
End: 2018-08-23

## 2018-08-23 DIAGNOSIS — L85.3 XEROSIS CUTIS: ICD-10-CM

## 2018-08-23 DIAGNOSIS — Z79.899 OTHER LONG TERM (CURRENT) DRUG THERAPY: ICD-10-CM

## 2018-08-23 DIAGNOSIS — L70.0 ACNE VULGARIS: ICD-10-CM

## 2018-08-23 PROCEDURE — ? TREATMENT REGIMEN

## 2018-08-23 PROCEDURE — ? URINE PREGNANCY TEST

## 2018-08-23 PROCEDURE — 99214 OFFICE O/P EST MOD 30 MIN: CPT

## 2018-08-23 PROCEDURE — ? PRESCRIPTION

## 2018-08-23 PROCEDURE — 81025 URINE PREGNANCY TEST: CPT

## 2018-08-23 PROCEDURE — ? HIGH RISK MEDICATION MONITORING

## 2018-08-23 PROCEDURE — ? COUNSELING

## 2018-08-23 PROCEDURE — ? DIAGNOSIS COMMENT

## 2018-08-23 RX ORDER — ISOTRETINOIN 40 MG/1
CAPSULE ORAL
Qty: 60 | Refills: 0 | Status: ERX

## 2018-08-23 ASSESSMENT — LOCATION SIMPLE DESCRIPTION DERM
LOCATION SIMPLE: RIGHT CHEEK
LOCATION SIMPLE: LEFT CHEEK

## 2018-08-23 ASSESSMENT — LOCATION DETAILED DESCRIPTION DERM
LOCATION DETAILED: RIGHT INFERIOR CENTRAL MALAR CHEEK
LOCATION DETAILED: LEFT MEDIAL MALAR CHEEK
LOCATION DETAILED: LEFT INFERIOR CENTRAL MALAR CHEEK

## 2018-08-23 ASSESSMENT — LOCATION ZONE DERM: LOCATION ZONE: FACE

## 2018-08-23 NOTE — PROCEDURE: TREATMENT REGIMEN
Plan: Location: Face\\nDuration: Finished 4th- going on 5th (of 2nd course)\\nMethod of Contraception: Abstinence\\nPrescribed: Absorica 40 mg, 2 capsules PO QD x 30 days--- instructed pt to take 1 month break from accutane and see how skin responds; pt can restart accutane if she has flare ups\\nPharmacy: [facility]\\niPledge: [phone]\\nURINE PREGNANCY TEST: Negative\\n\\nPt is here for Accutane f/u.\\nPt is in second course of Accutane\\nPt finished 4 months of Absorica 80mg po qd and states that she still has acne lesions and open/closed comedones \\nAlthough it is not as severe as it was when she first started, discussed with patient that this is indicative that she needs a few more months of medication.\\nPt states her skin is much smoother and has improved while on medication, but she can noticed her pores becoming clogged again. \\nPt discussed that she had dryness, but no other side effects while on medication. \\nToday pt still has post inflammatory hyperpigmentation on cheeks-- discussed with pt that redness should decrease while off of Accutane\\nWill continue Absorica 80mg pro qd x 30 days--- instructed pt to take 1 month break from accutane and see how skin responds; pt can restart accutane if she has flare ups\\n\\nDiscussed with pt to use MC/ healing ointment such as Cerave to help reestablish a healthy skin barrier.\\n\\nIPLEDGE program and consent form discussed with patient. The side effects and warnings for the use of Accutane were discussed with the patient. She understands she can not donate blood for 1 month post tx and can not have laser tx, cosmetic surgery, waxing or peels for 6 months post treatment.\\n\\nDiscussed musculoskeletal SE’s in detail. Discussed HA’s, dry skin, and depression in detail. Patient denies depression. Denies personal or family H/O Crohns, IBS, IBD, or bloody stools.\\n\\nF/u in 2 months
Detail Level: Zone

## 2018-10-10 ENCOUNTER — RX ONLY (OUTPATIENT)
Age: 23
Setting detail: RX ONLY
End: 2018-10-10

## 2018-10-10 RX ORDER — PIMECROLIMUS 10 MG/G
CREAM TOPICAL
Qty: 1 | Refills: 10 | Status: ERX | COMMUNITY
Start: 2018-10-10

## 2018-10-23 ENCOUNTER — RX ONLY (OUTPATIENT)
Age: 23
Setting detail: RX ONLY
End: 2018-10-23

## 2018-10-23 RX ORDER — DICAPRYLYL CARBONATE/DIMETH
SPRAY, NON-AEROSOL (ML) TOPICAL
Qty: 1 | Refills: 10 | Status: ERX | COMMUNITY
Start: 2018-10-23

## 2018-10-23 RX ORDER — DAPSONE 75 MG/G
GEL TOPICAL
Qty: 1 | Refills: 10 | Status: ERX | COMMUNITY
Start: 2018-10-23

## 2018-10-23 RX ORDER — BENZOYL PEROXIDE 95 MG/G
AEROSOL, FOAM TOPICAL
Qty: 1 | Refills: 10 | Status: ERX | COMMUNITY
Start: 2018-10-23

## 2018-10-23 RX ORDER — CLINDAMYCIN PHOSPHATE 10 MG/G
AEROSOL, FOAM TOPICAL
Qty: 1 | Refills: 10 | Status: ERX | COMMUNITY
Start: 2018-10-23

## 2018-10-24 ENCOUNTER — APPOINTMENT (RX ONLY)
Dept: URBAN - METROPOLITAN AREA CLINIC 45 | Facility: CLINIC | Age: 23
Setting detail: DERMATOLOGY
End: 2018-10-24

## 2018-10-24 DIAGNOSIS — L82.0 INFLAMED SEBORRHEIC KERATOSIS: ICD-10-CM

## 2018-10-24 DIAGNOSIS — L85.3 XEROSIS CUTIS: ICD-10-CM

## 2018-10-24 DIAGNOSIS — Z79.899 OTHER LONG TERM (CURRENT) DRUG THERAPY: ICD-10-CM

## 2018-10-24 DIAGNOSIS — L70.0 ACNE VULGARIS: ICD-10-CM

## 2018-10-24 PROBLEM — D48.5 NEOPLASM OF UNCERTAIN BEHAVIOR OF SKIN: Status: ACTIVE | Noted: 2018-10-24

## 2018-10-24 PROCEDURE — ? DIAGNOSIS COMMENT

## 2018-10-24 PROCEDURE — ? HIGH RISK MEDICATION MONITORING

## 2018-10-24 PROCEDURE — ? COUNSELING

## 2018-10-24 PROCEDURE — 81025 URINE PREGNANCY TEST: CPT

## 2018-10-24 PROCEDURE — ? TREATMENT REGIMEN

## 2018-10-24 PROCEDURE — ? URINE PREGNANCY TEST

## 2018-10-24 ASSESSMENT — LOCATION SIMPLE DESCRIPTION DERM
LOCATION SIMPLE: LEFT CHEEK
LOCATION SIMPLE: RIGHT CHEEK

## 2018-10-24 ASSESSMENT — LOCATION ZONE DERM: LOCATION ZONE: FACE

## 2018-10-24 NOTE — PROCEDURE: URINE PREGNANCY TEST
Lot # (Optional): [id number]
Detail Level: None
Expiration Date (Optional): 2020-07-31
Urine Pregnancy Test Result: negative

## 2018-10-24 NOTE — PROCEDURE: DIAGNOSIS COMMENT
Detail Level: Simple
Comment: Nod[facility]ocystic Acne -- [facility] ([facility] finished 8 months of Accutane in 1st course, 5 months of Accutane in 2nd course)

## 2018-10-24 NOTE — PROCEDURE: TREATMENT REGIMEN
Detail Level: Zone
Plan: Location: Face\\nDuration:  Re-registering in [facility] today (pt finished 8 months of Accutane in 1st course, 5 months of Accutane in 2nd course)\\nMethod of Contraception: Abstinence\\nPrescribed: CONT: Avar wash\\nCONT: Absorica 40 mg, 2 capsules QD--- will not send rx today; pt has remaining supply at home\\nPharmacy: [facility]\\niPledge: [phone]\\nURINE PREGNANCY TEST: Negative\\n\\nPt is here for Accutane f/u after taking a 2 month break from Accutane\\nToday, pt states she had increased oil production and inflamed acne on her face (especially on the nose)\\nPt also states her skin is much smoother and has improved while on medication, but she can notice her pores becoming clogged again. \\nAlthough it is not as severe as it was when she first started, discussed with patient that this is indicative that she needs a few more months of medication.\\n\\nWill re-register pt in [facility] today (pt finished 8 months of Accutane in 1st course, 5 months of Accutane in 2nd course)\\[place] office UPT: negative\\nDiscussed with pt that blood work does not have to be completed again\\n\\nWill prescribe:\\nCONT: Avar wash\\nCONT: Absorica 40 mg, 2 capsules QD--- will not send rx today; pt has remaining supply at home\\n\\nIPLEDGE program and consent form discussed with patient. The side effects and warnings for the use of Accutane were discussed with the patient. She understands she can not donate blood for 1 month post tx and can not have laser tx, cosmetic surgery, waxing or peels for 6 months post treatment.\\nDiscussed musculoskeletal SE’s in detail. Discussed HA’s, dry skin, and depression in detail. Patient denies depression. Denies personal or family H/O Crohns, IBS, IBD, or bloody stools.\\n\\nF/u in 1 month
Plan: Location: left cheek\\nTreatment: electrodessication\\n\\nPt presents a small brown growth on her left cheek\\nDiscussed with Pt that this may be an early seborrheic keratosis or verruca—Will treat with electrodessication today\\n\\nF/u as needed

## 2018-11-12 ENCOUNTER — RX ONLY (OUTPATIENT)
Age: 23
Setting detail: RX ONLY
End: 2018-11-12

## 2018-11-12 RX ORDER — VALACYCLOVIR HYDROCHLORIDE 1 G/1
TABLET, FILM COATED ORAL
Qty: 30 | Refills: 10 | Status: ERX

## 2018-11-12 RX ORDER — SULFACETAMIDE SODIUM, SULFUR 100; 20 MG/G; MG/G
CREAM TOPICAL
Qty: 1 | Refills: 10 | Status: ERX | COMMUNITY
Start: 2018-11-12

## 2018-11-21 ENCOUNTER — RX ONLY (OUTPATIENT)
Age: 23
Setting detail: RX ONLY
End: 2018-11-21

## 2018-11-21 ENCOUNTER — APPOINTMENT (RX ONLY)
Dept: URBAN - METROPOLITAN AREA CLINIC 45 | Facility: CLINIC | Age: 23
Setting detail: DERMATOLOGY
End: 2018-11-21

## 2018-11-21 DIAGNOSIS — Z79.899 OTHER LONG TERM (CURRENT) DRUG THERAPY: ICD-10-CM

## 2018-11-21 DIAGNOSIS — L70.0 ACNE VULGARIS: ICD-10-CM

## 2018-11-21 DIAGNOSIS — L85.3 XEROSIS CUTIS: ICD-10-CM

## 2018-11-21 PROCEDURE — ? TREATMENT REGIMEN

## 2018-11-21 PROCEDURE — ? COUNSELING

## 2018-11-21 PROCEDURE — ? HIGH RISK MEDICATION MONITORING

## 2018-11-21 PROCEDURE — ? DIAGNOSIS COMMENT

## 2018-11-21 PROCEDURE — ? URINE PREGNANCY TEST

## 2018-11-21 RX ORDER — ISOTRETINOIN 40 MG/1
CAPSULE ORAL
Qty: 90 | Refills: 0 | Status: ERX

## 2018-11-21 ASSESSMENT — LOCATION ZONE DERM: LOCATION ZONE: FACE

## 2018-11-21 ASSESSMENT — LOCATION SIMPLE DESCRIPTION DERM
LOCATION SIMPLE: LEFT CHEEK
LOCATION SIMPLE: RIGHT CHEEK

## 2018-11-21 NOTE — PROCEDURE: URINE PREGNANCY TEST
Expiration Date (Optional): 2020-07-31
Lot # (Optional): [id number]
Urine Pregnancy Test Result: negative
Detail Level: None

## 2018-11-21 NOTE — PROCEDURE: DIAGNOSIS COMMENT
Comment: Nodulocystic Acne -- starting 1st month of 3rd course (pt finished 8 months of Accutane in 1st course, 5 months of Accutane in 2nd course)
Detail Level: Simple

## 2018-11-21 NOTE — PROCEDURE: TREATMENT REGIMEN
Detail Level: Zone
Plan: Location: Face\\nDuration:  starting 1st month of 3rd course (pt finished 8 months of Accutane in 1st course, 5 months of Accutane in 2nd course)\\n*Patient will take medication daily for 6 weeks and will then take a break from medication. Discussed that at that time, we will closely evaluate her skin in order to make sure that there is no acne. Discussed that I suspect that it is the hyperpigmentation that is bothering her and NOT acne) \\nDiscussed microneedling with Evelyn in [place] during 2019 \\nMethod of Contraception: Abstinence\\nPrescribed: CONT: Avar wash\\nCONT: Absorica 40 mg, 2 capsules QD\\nPharmacy: [facility]\\niPledge: [phone]\\nURINE PREGNANCY TEST: Negative\\n\\nPt is ready to restart Accutane after 3 months of being off of medication\\nToday, pt states she had increased oil production while off of Accutane and inflamed acne on her face (especially on the nose)\\nPt also states her skin is much smoother and has improved while on medication, but she can notice her pores becoming clogged again. \\nAlthough it is not as severe as it was when she first started, discussed with patient that this is indicative that she needs a few more months of medication.\\n\\nWill prescribe:\\nCONT: Avar wash\\nCONT: Absorica 40 mg, 2 capsules QD\\n\\nIPLEDGE program and consent form discussed with patient. The side effects and warnings for the use of Accutane were discussed with the patient. She understands she can not donate blood for 1 month post tx and can not have laser tx, cosmetic surgery, waxing or peels for 6 months post treatment.\\nDiscussed musculoskeletal SE’s in detail. Discussed HA’s, dry skin, and depression in detail. Patient denies depression. Denies personal or family H/O Crohns, IBS, IBD, or bloody stools.\\n\\nF/u in 1 month

## 2019-04-03 ENCOUNTER — RX ONLY (OUTPATIENT)
Age: 24
Setting detail: RX ONLY
End: 2019-04-03

## 2019-04-03 RX ORDER — PREDNISONE 20 MG/1
TABLET ORAL
Qty: 5 | Refills: 0 | Status: ERX | COMMUNITY
Start: 2019-04-03

## 2019-04-09 ENCOUNTER — RX ONLY (OUTPATIENT)
Age: 24
Setting detail: RX ONLY
End: 2019-04-09

## 2019-04-09 RX ORDER — PREDNISONE 20 MG/1
TABLET ORAL
Qty: 5 | Refills: 0 | Status: ERX

## 2019-04-10 ENCOUNTER — RX ONLY (OUTPATIENT)
Age: 24
Setting detail: RX ONLY
End: 2019-04-10

## 2019-04-10 RX ORDER — SULFAMETHOXAZOLE AND TRIMETHOPRIM 800; 160 MG/1; MG/1
TABLET ORAL
Qty: 28 | Refills: 0 | Status: ERX | COMMUNITY
Start: 2019-04-10

## 2019-04-11 ENCOUNTER — RX ONLY (OUTPATIENT)
Age: 24
Setting detail: RX ONLY
End: 2019-04-11

## 2019-04-11 RX ORDER — BIMATOPROST 0.3 MG/ML
SOLUTION/ DROPS OPHTHALMIC
Qty: 1 | Refills: 4 | Status: ERX | COMMUNITY
Start: 2019-04-11

## 2019-06-26 ENCOUNTER — RX ONLY (OUTPATIENT)
Age: 24
Setting detail: RX ONLY
End: 2019-06-26

## 2019-06-26 RX ORDER — BIMATOPROST 0.3 MG/ML
SOLUTION/ DROPS OPHTHALMIC
Qty: 1 | Refills: 4 | Status: ERX

## 2019-07-02 ENCOUNTER — RX ONLY (OUTPATIENT)
Age: 24
Setting detail: RX ONLY
End: 2019-07-02

## 2019-07-02 RX ORDER — OXYMETAZOLINE HYDROCHLORIDE 10 MG/G
CREAM TOPICAL
Qty: 1 | Refills: 6 | Status: ERX